# Patient Record
Sex: FEMALE | Race: WHITE | Employment: UNEMPLOYED | URBAN - METROPOLITAN AREA
[De-identification: names, ages, dates, MRNs, and addresses within clinical notes are randomized per-mention and may not be internally consistent; named-entity substitution may affect disease eponyms.]

---

## 2021-02-23 ENCOUNTER — APPOINTMENT (OUTPATIENT)
Dept: GENERAL RADIOLOGY | Age: 31
End: 2021-02-23
Payer: COMMERCIAL

## 2021-02-23 ENCOUNTER — APPOINTMENT (OUTPATIENT)
Dept: CT IMAGING | Age: 31
End: 2021-02-23
Payer: COMMERCIAL

## 2021-02-23 ENCOUNTER — HOSPITAL ENCOUNTER (OUTPATIENT)
Age: 31
Setting detail: OBSERVATION
Discharge: HOME | End: 2021-02-24
Attending: EMERGENCY MEDICINE | Admitting: FAMILY MEDICINE
Payer: COMMERCIAL

## 2021-02-23 DIAGNOSIS — R56.9 SEIZURE (HCC): Primary | ICD-10-CM

## 2021-02-23 DIAGNOSIS — F13.939 BENZODIAZEPINE WITHDRAWAL WITH COMPLICATION (HCC): ICD-10-CM

## 2021-02-23 DIAGNOSIS — F13.939: ICD-10-CM

## 2021-02-23 LAB
ACETAMINOPHEN LEVEL: <5 MCG/ML (ref 10–30)
ALBUMIN SERPL-MCNC: 4.6 G/DL (ref 3.5–5.2)
ALP BLD-CCNC: 63 U/L (ref 35–104)
ALT SERPL-CCNC: 12 U/L (ref 0–32)
AMPHETAMINE SCREEN, URINE: NOT DETECTED
ANION GAP SERPL CALCULATED.3IONS-SCNC: 9 MMOL/L (ref 7–16)
AST SERPL-CCNC: 15 U/L (ref 0–31)
BACTERIA: ABNORMAL /HPF
BARBITURATE SCREEN URINE: POSITIVE
BASOPHILS ABSOLUTE: 0.05 E9/L (ref 0–0.2)
BASOPHILS RELATIVE PERCENT: 0.6 % (ref 0–2)
BENZODIAZEPINE SCREEN, URINE: POSITIVE
BILIRUB SERPL-MCNC: 0.3 MG/DL (ref 0–1.2)
BILIRUBIN URINE: NEGATIVE
BLOOD, URINE: NEGATIVE
BUN BLDV-MCNC: 9 MG/DL (ref 6–20)
CALCIUM SERPL-MCNC: 9.3 MG/DL (ref 8.6–10.2)
CANNABINOID SCREEN URINE: POSITIVE
CHLORIDE BLD-SCNC: 103 MMOL/L (ref 98–107)
CLARITY: CLEAR
CO2: 27 MMOL/L (ref 22–29)
COCAINE METABOLITE SCREEN URINE: POSITIVE
COLOR: YELLOW
CREAT SERPL-MCNC: 0.7 MG/DL (ref 0.5–1)
EOSINOPHILS ABSOLUTE: 0.13 E9/L (ref 0.05–0.5)
EOSINOPHILS RELATIVE PERCENT: 1.6 % (ref 0–6)
EPITHELIAL CELLS, UA: ABNORMAL /HPF
ETHANOL: <10 MG/DL (ref 0–0.08)
FENTANYL SCREEN, URINE: NOT DETECTED
GFR AFRICAN AMERICAN: >60
GFR NON-AFRICAN AMERICAN: >60 ML/MIN/1.73
GLUCOSE BLD-MCNC: 94 MG/DL (ref 74–99)
GLUCOSE URINE: NEGATIVE MG/DL
HCG, URINE, POC: NEGATIVE
HCT VFR BLD CALC: 42.3 % (ref 34–48)
HEMOGLOBIN: 14.2 G/DL (ref 11.5–15.5)
IMMATURE GRANULOCYTES #: 0.02 E9/L
IMMATURE GRANULOCYTES %: 0.3 % (ref 0–5)
KETONES, URINE: NEGATIVE MG/DL
LEUKOCYTE ESTERASE, URINE: ABNORMAL
LYMPHOCYTES ABSOLUTE: 2.27 E9/L (ref 1.5–4)
LYMPHOCYTES RELATIVE PERCENT: 28.8 % (ref 20–42)
Lab: ABNORMAL
Lab: NORMAL
MCH RBC QN AUTO: 30 PG (ref 26–35)
MCHC RBC AUTO-ENTMCNC: 33.6 % (ref 32–34.5)
MCV RBC AUTO: 89.2 FL (ref 80–99.9)
METHADONE SCREEN, URINE: NOT DETECTED
MONOCYTES ABSOLUTE: 0.39 E9/L (ref 0.1–0.95)
MONOCYTES RELATIVE PERCENT: 4.9 % (ref 2–12)
MUCUS: PRESENT /LPF
NEGATIVE QC PASS/FAIL: NORMAL
NEUTROPHILS ABSOLUTE: 5.03 E9/L (ref 1.8–7.3)
NEUTROPHILS RELATIVE PERCENT: 63.8 % (ref 43–80)
NITRITE, URINE: NEGATIVE
OPIATE SCREEN URINE: NOT DETECTED
OXYCODONE URINE: NOT DETECTED
PDW BLD-RTO: 12.8 FL (ref 11.5–15)
PH UA: 7 (ref 5–9)
PHENCYCLIDINE SCREEN URINE: NOT DETECTED
PLATELET # BLD: 260 E9/L (ref 130–450)
PMV BLD AUTO: 9.7 FL (ref 7–12)
POSITIVE QC PASS/FAIL: NORMAL
POTASSIUM SERPL-SCNC: 4 MMOL/L (ref 3.5–5)
PROTEIN UA: NEGATIVE MG/DL
RBC # BLD: 4.74 E12/L (ref 3.5–5.5)
RBC UA: ABNORMAL /HPF (ref 0–2)
SALICYLATE, SERUM: <0.3 MG/DL (ref 0–30)
SODIUM BLD-SCNC: 139 MMOL/L (ref 132–146)
SPECIFIC GRAVITY UA: 1.01 (ref 1–1.03)
TOTAL PROTEIN: 7.7 G/DL (ref 6.4–8.3)
TRICYCLIC ANTIDEPRESSANTS SCREEN SERUM: NEGATIVE NG/ML
TSH SERPL DL<=0.05 MIU/L-ACNC: 0.99 UIU/ML (ref 0.27–4.2)
UROBILINOGEN, URINE: 0.2 E.U./DL
WBC # BLD: 7.9 E9/L (ref 4.5–11.5)
WBC UA: ABNORMAL /HPF (ref 0–5)

## 2021-02-23 PROCEDURE — 80143 DRUG ASSAY ACETAMINOPHEN: CPT

## 2021-02-23 PROCEDURE — G0378 HOSPITAL OBSERVATION PER HR: HCPCS

## 2021-02-23 PROCEDURE — 96376 TX/PRO/DX INJ SAME DRUG ADON: CPT

## 2021-02-23 PROCEDURE — 71045 X-RAY EXAM CHEST 1 VIEW: CPT

## 2021-02-23 PROCEDURE — 96374 THER/PROPH/DIAG INJ IV PUSH: CPT

## 2021-02-23 PROCEDURE — 99220 PR INITIAL OBSERVATION CARE/DAY 70 MINUTES: CPT | Performed by: FAMILY MEDICINE

## 2021-02-23 PROCEDURE — APPSS45 APP SPLIT SHARED TIME 31-45 MINUTES: Performed by: NURSE PRACTITIONER

## 2021-02-23 PROCEDURE — 87088 URINE BACTERIA CULTURE: CPT

## 2021-02-23 PROCEDURE — 84443 ASSAY THYROID STIM HORMONE: CPT

## 2021-02-23 PROCEDURE — 6370000000 HC RX 637 (ALT 250 FOR IP): Performed by: NURSE PRACTITIONER

## 2021-02-23 PROCEDURE — 80179 DRUG ASSAY SALICYLATE: CPT

## 2021-02-23 PROCEDURE — 6360000002 HC RX W HCPCS: Performed by: FAMILY MEDICINE

## 2021-02-23 PROCEDURE — 2580000003 HC RX 258: Performed by: NURSE PRACTITIONER

## 2021-02-23 PROCEDURE — 70450 CT HEAD/BRAIN W/O DYE: CPT

## 2021-02-23 PROCEDURE — 93005 ELECTROCARDIOGRAM TRACING: CPT | Performed by: EMERGENCY MEDICINE

## 2021-02-23 PROCEDURE — 81001 URINALYSIS AUTO W/SCOPE: CPT

## 2021-02-23 PROCEDURE — 85025 COMPLETE CBC W/AUTO DIFF WBC: CPT

## 2021-02-23 PROCEDURE — 2580000003 HC RX 258: Performed by: EMERGENCY MEDICINE

## 2021-02-23 PROCEDURE — 80053 COMPREHEN METABOLIC PANEL: CPT

## 2021-02-23 PROCEDURE — 82077 ASSAY SPEC XCP UR&BREATH IA: CPT

## 2021-02-23 PROCEDURE — 80307 DRUG TEST PRSMV CHEM ANLYZR: CPT

## 2021-02-23 PROCEDURE — 99284 EMERGENCY DEPT VISIT MOD MDM: CPT

## 2021-02-23 RX ORDER — MIRTAZAPINE 7.5 MG/1
7.5 TABLET, FILM COATED ORAL NIGHTLY PRN
Status: ON HOLD | COMMUNITY
End: 2021-02-23

## 2021-02-23 RX ORDER — PHENOBARBITAL 100 MG/1
100 TABLET ORAL 3 TIMES DAILY
Status: ON HOLD | COMMUNITY
Start: 2021-02-22 | End: 2021-02-24 | Stop reason: HOSPADM

## 2021-02-23 RX ORDER — HYDROXYZINE PAMOATE 50 MG/1
100 CAPSULE ORAL EVERY 6 HOURS PRN
Status: ON HOLD | COMMUNITY
End: 2021-02-23

## 2021-02-23 RX ORDER — DOCUSATE SODIUM 100 MG/1
100 CAPSULE, LIQUID FILLED ORAL DAILY PRN
Status: ON HOLD | COMMUNITY
End: 2021-02-23

## 2021-02-23 RX ORDER — SODIUM CHLORIDE 0.9 % (FLUSH) 0.9 %
10 SYRINGE (ML) INJECTION PRN
Status: DISCONTINUED | OUTPATIENT
Start: 2021-02-23 | End: 2021-02-24 | Stop reason: HOSPADM

## 2021-02-23 RX ORDER — 0.9 % SODIUM CHLORIDE 0.9 %
1000 INTRAVENOUS SOLUTION INTRAVENOUS ONCE
Status: COMPLETED | OUTPATIENT
Start: 2021-02-23 | End: 2021-02-23

## 2021-02-23 RX ORDER — SODIUM CHLORIDE 0.9 % (FLUSH) 0.9 %
10 SYRINGE (ML) INJECTION EVERY 12 HOURS SCHEDULED
Status: DISCONTINUED | OUTPATIENT
Start: 2021-02-23 | End: 2021-02-24 | Stop reason: HOSPADM

## 2021-02-23 RX ORDER — ONDANSETRON 2 MG/ML
4 INJECTION INTRAMUSCULAR; INTRAVENOUS EVERY 6 HOURS PRN
Status: DISCONTINUED | OUTPATIENT
Start: 2021-02-23 | End: 2021-02-23

## 2021-02-23 RX ORDER — CEPHALEXIN 500 MG/1
500 CAPSULE ORAL
Status: ON HOLD | COMMUNITY
End: 2021-02-23

## 2021-02-23 RX ORDER — ACETAMINOPHEN 650 MG/1
650 SUPPOSITORY RECTAL EVERY 6 HOURS PRN
Status: DISCONTINUED | OUTPATIENT
Start: 2021-02-23 | End: 2021-02-24 | Stop reason: HOSPADM

## 2021-02-23 RX ORDER — FLUOXETINE HYDROCHLORIDE 20 MG/1
20 CAPSULE ORAL DAILY
COMMUNITY

## 2021-02-23 RX ORDER — NALTREXONE 380 MG
380 KIT INTRAMUSCULAR ONCE
Status: ON HOLD | COMMUNITY
End: 2021-02-23 | Stop reason: ALTCHOICE

## 2021-02-23 RX ORDER — MAGNESIUM HYDROXIDE/ALUMINUM HYDROXICE/SIMETHICONE 120; 1200; 1200 MG/30ML; MG/30ML; MG/30ML
5 SUSPENSION ORAL EVERY 4 HOURS PRN
Status: ON HOLD | COMMUNITY
End: 2021-02-23 | Stop reason: ALTCHOICE

## 2021-02-23 RX ORDER — NICOTINE 21 MG/24HR
1 PATCH, TRANSDERMAL 24 HOURS TRANSDERMAL DAILY
Status: DISCONTINUED | OUTPATIENT
Start: 2021-02-23 | End: 2021-02-24 | Stop reason: HOSPADM

## 2021-02-23 RX ORDER — MIRTAZAPINE 30 MG/1
30 TABLET, FILM COATED ORAL NIGHTLY
COMMUNITY

## 2021-02-23 RX ORDER — LOPERAMIDE HYDROCHLORIDE 2 MG/1
2 CAPSULE ORAL PRN
Status: ON HOLD | COMMUNITY
End: 2021-02-23 | Stop reason: ALTCHOICE

## 2021-02-23 RX ORDER — DICYCLOMINE HCL 20 MG
20 TABLET ORAL EVERY 6 HOURS PRN
Status: ON HOLD | COMMUNITY
End: 2021-02-23

## 2021-02-23 RX ORDER — FLUOXETINE HYDROCHLORIDE 20 MG/1
20 CAPSULE ORAL DAILY
Status: ON HOLD | COMMUNITY
End: 2021-02-23

## 2021-02-23 RX ORDER — LORATADINE 10 MG/1
10 TABLET ORAL DAILY PRN
Status: ON HOLD | COMMUNITY
End: 2021-02-23

## 2021-02-23 RX ORDER — PROMETHAZINE HYDROCHLORIDE 25 MG/1
12.5 TABLET ORAL EVERY 6 HOURS PRN
Status: DISCONTINUED | OUTPATIENT
Start: 2021-02-23 | End: 2021-02-23

## 2021-02-23 RX ORDER — ONDANSETRON 4 MG/1
4 TABLET, FILM COATED ORAL EVERY 6 HOURS PRN
Status: ON HOLD | COMMUNITY
End: 2021-02-24 | Stop reason: HOSPADM

## 2021-02-23 RX ORDER — M-VIT,TX,IRON,MINS/CALC/FOLIC 27MG-0.4MG
1 TABLET ORAL DAILY
Status: DISCONTINUED | OUTPATIENT
Start: 2021-02-23 | End: 2021-02-24 | Stop reason: HOSPADM

## 2021-02-23 RX ORDER — POLYETHYLENE GLYCOL 3350 17 G/17G
17 POWDER, FOR SOLUTION ORAL DAILY PRN
Status: DISCONTINUED | OUTPATIENT
Start: 2021-02-23 | End: 2021-02-24 | Stop reason: HOSPADM

## 2021-02-23 RX ORDER — PRAZOSIN HYDROCHLORIDE 5 MG/1
5 CAPSULE ORAL NIGHTLY
Status: ON HOLD | COMMUNITY
End: 2021-02-24 | Stop reason: HOSPADM

## 2021-02-23 RX ORDER — LORAZEPAM 2 MG/ML
2 INJECTION INTRAMUSCULAR EVERY 6 HOURS PRN
Status: DISCONTINUED | OUTPATIENT
Start: 2021-02-23 | End: 2021-02-24

## 2021-02-23 RX ORDER — CLONIDINE HYDROCHLORIDE 0.1 MG/1
0.1 TABLET ORAL
Status: ON HOLD | COMMUNITY
End: 2021-02-23

## 2021-02-23 RX ORDER — IBUPROFEN 200 MG
400 TABLET ORAL EVERY 6 HOURS PRN
Status: ON HOLD | COMMUNITY
End: 2021-02-24 | Stop reason: HOSPADM

## 2021-02-23 RX ORDER — PHENOBARBITAL 60 MG/1
60 TABLET ORAL 2 TIMES DAILY
Status: ON HOLD | COMMUNITY
Start: 2021-02-24 | End: 2021-02-24 | Stop reason: HOSPADM

## 2021-02-23 RX ORDER — PHENOBARBITAL 60 MG/1
60 TABLET ORAL DAILY
Status: ON HOLD | COMMUNITY
Start: 2021-02-25 | End: 2021-02-24 | Stop reason: HOSPADM

## 2021-02-23 RX ORDER — PHENOBARBITAL 60 MG/1
60 TABLET ORAL 3 TIMES DAILY
Status: ON HOLD | COMMUNITY
Start: 2021-02-23 | End: 2021-02-24 | Stop reason: HOSPADM

## 2021-02-23 RX ORDER — CALCIUM CARBONATE 200(500)MG
2 TABLET,CHEWABLE ORAL PRN
Status: ON HOLD | COMMUNITY
End: 2021-02-23

## 2021-02-23 RX ORDER — ONDANSETRON 2 MG/ML
8 INJECTION INTRAMUSCULAR; INTRAVENOUS EVERY 6 HOURS PRN
Status: ON HOLD | COMMUNITY
End: 2021-02-23

## 2021-02-23 RX ORDER — ACETAMINOPHEN 325 MG/1
650 TABLET ORAL EVERY 6 HOURS PRN
Status: DISCONTINUED | OUTPATIENT
Start: 2021-02-23 | End: 2021-02-24 | Stop reason: HOSPADM

## 2021-02-23 RX ORDER — CHOLECALCIFEROL (VITAMIN D3) 125 MCG
10 CAPSULE ORAL NIGHTLY PRN
Status: ON HOLD | COMMUNITY
End: 2021-02-23

## 2021-02-23 RX ORDER — PHENOBARBITAL 100 MG/1
100 TABLET ORAL 4 TIMES DAILY
Status: ON HOLD | COMMUNITY
Start: 2021-02-22 | End: 2021-02-24 | Stop reason: HOSPADM

## 2021-02-23 RX ORDER — M-VIT,TX,IRON,MINS/CALC/FOLIC 27MG-0.4MG
1 TABLET ORAL DAILY
COMMUNITY

## 2021-02-23 RX ORDER — BACLOFEN 10 MG/1
10 TABLET ORAL EVERY 8 HOURS PRN
Status: ON HOLD | COMMUNITY
End: 2021-02-23

## 2021-02-23 RX ADMIN — SODIUM CHLORIDE, PRESERVATIVE FREE 10 ML: 5 INJECTION INTRAVENOUS at 15:30

## 2021-02-23 RX ADMIN — Medication 1 TABLET: at 15:30

## 2021-02-23 RX ADMIN — LORAZEPAM 2 MG: 2 INJECTION INTRAMUSCULAR at 21:29

## 2021-02-23 RX ADMIN — SODIUM CHLORIDE, PRESERVATIVE FREE 10 ML: 5 INJECTION INTRAVENOUS at 21:29

## 2021-02-23 RX ADMIN — SODIUM CHLORIDE 1000 ML: 9 INJECTION, SOLUTION INTRAVENOUS at 11:26

## 2021-02-23 RX ADMIN — LORAZEPAM 2 MG: 2 INJECTION INTRAMUSCULAR at 15:30

## 2021-02-23 ASSESSMENT — PAIN SCALES - GENERAL: PAINLEVEL_OUTOF10: 0
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
## 2021-02-23 NOTE — ED NOTES
Receipt of SBAR confirmed by Rosa Degroot on [place] [id number], [address] [facility]  02/23/21 9837
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
## 2021-02-23 NOTE — PROGRESS NOTES
Pt declines database at this time. \"This is the worst hospital I ever been to, I give you Zero stars. \"

## 2021-02-24 VITALS
SYSTOLIC BLOOD PRESSURE: 115 MMHG | OXYGEN SATURATION: 95 % | DIASTOLIC BLOOD PRESSURE: 69 MMHG | WEIGHT: 175.3 LBS | HEART RATE: 89 BPM | RESPIRATION RATE: 16 BRPM | BODY MASS INDEX: 26.57 KG/M2 | TEMPERATURE: 98.3 F | HEIGHT: 68 IN

## 2021-02-24 LAB
ANION GAP SERPL CALCULATED.3IONS-SCNC: 9 MMOL/L (ref 7–16)
BUN BLDV-MCNC: 10 MG/DL (ref 6–20)
CALCIUM SERPL-MCNC: 8.8 MG/DL (ref 8.6–10.2)
CHLORIDE BLD-SCNC: 105 MMOL/L (ref 98–107)
CO2: 23 MMOL/L (ref 22–29)
CREAT SERPL-MCNC: 0.6 MG/DL (ref 0.5–1)
GFR AFRICAN AMERICAN: >60
GFR NON-AFRICAN AMERICAN: >60 ML/MIN/1.73
GLUCOSE BLD-MCNC: 83 MG/DL (ref 74–99)
HCT VFR BLD CALC: 40.1 % (ref 34–48)
HEMOGLOBIN: 13.8 G/DL (ref 11.5–15.5)
MCH RBC QN AUTO: 30.5 PG (ref 26–35)
MCHC RBC AUTO-ENTMCNC: 34.4 % (ref 32–34.5)
MCV RBC AUTO: 88.5 FL (ref 80–99.9)
PDW BLD-RTO: 12.7 FL (ref 11.5–15)
PLATELET # BLD: 263 E9/L (ref 130–450)
PMV BLD AUTO: 10.3 FL (ref 7–12)
POTASSIUM REFLEX MAGNESIUM: 3.9 MMOL/L (ref 3.5–5)
RBC # BLD: 4.53 E12/L (ref 3.5–5.5)
SODIUM BLD-SCNC: 137 MMOL/L (ref 132–146)
WBC # BLD: 6.9 E9/L (ref 4.5–11.5)

## 2021-02-24 PROCEDURE — 99225 PR SBSQ OBSERVATION CARE/DAY 25 MINUTES: CPT | Performed by: PSYCHIATRY & NEUROLOGY

## 2021-02-24 PROCEDURE — 6360000002 HC RX W HCPCS: Performed by: FAMILY MEDICINE

## 2021-02-24 PROCEDURE — 80048 BASIC METABOLIC PNL TOTAL CA: CPT

## 2021-02-24 PROCEDURE — 36415 COLL VENOUS BLD VENIPUNCTURE: CPT

## 2021-02-24 PROCEDURE — 6370000000 HC RX 637 (ALT 250 FOR IP): Performed by: PSYCHIATRY & NEUROLOGY

## 2021-02-24 PROCEDURE — G0378 HOSPITAL OBSERVATION PER HR: HCPCS

## 2021-02-24 PROCEDURE — APPSS45 APP SPLIT SHARED TIME 31-45 MINUTES: Performed by: NURSE PRACTITIONER

## 2021-02-24 PROCEDURE — 6370000000 HC RX 637 (ALT 250 FOR IP): Performed by: NURSE PRACTITIONER

## 2021-02-24 PROCEDURE — 96376 TX/PRO/DX INJ SAME DRUG ADON: CPT

## 2021-02-24 PROCEDURE — 85027 COMPLETE CBC AUTOMATED: CPT

## 2021-02-24 PROCEDURE — 2580000003 HC RX 258: Performed by: NURSE PRACTITIONER

## 2021-02-24 RX ORDER — HYDROXYZINE HYDROCHLORIDE 50 MG/ML
25 INJECTION, SOLUTION INTRAMUSCULAR EVERY 6 HOURS PRN
Status: DISCONTINUED | OUTPATIENT
Start: 2021-02-24 | End: 2021-02-24 | Stop reason: HOSPADM

## 2021-02-24 RX ORDER — PHENOBARBITAL 64.8 MG/1
64.8 TABLET ORAL 2 TIMES DAILY
Qty: 6 TABLET | Refills: 0 | Status: SHIPPED | OUTPATIENT
Start: 2021-02-24 | End: 2021-02-27

## 2021-02-24 RX ORDER — LORAZEPAM 0.5 MG/1
TABLET ORAL
Qty: 7 TABLET | Refills: 0 | Status: SHIPPED | OUTPATIENT
Start: 2021-02-24 | End: 2021-02-28

## 2021-02-24 RX ORDER — LORAZEPAM 0.5 MG/1
0.5 TABLET ORAL ONCE
Status: DISCONTINUED | OUTPATIENT
Start: 2021-02-25 | End: 2021-02-24 | Stop reason: HOSPADM

## 2021-02-24 RX ORDER — HYDROXYZINE PAMOATE 25 MG/1
25 CAPSULE ORAL EVERY 6 HOURS PRN
Status: DISCONTINUED | OUTPATIENT
Start: 2021-02-24 | End: 2021-02-24 | Stop reason: HOSPADM

## 2021-02-24 RX ORDER — MIRTAZAPINE 15 MG/1
30 TABLET, FILM COATED ORAL NIGHTLY
Status: DISCONTINUED | OUTPATIENT
Start: 2021-02-24 | End: 2021-02-24 | Stop reason: HOSPADM

## 2021-02-24 RX ORDER — LORAZEPAM 0.5 MG/1
0.5 TABLET ORAL ONCE
Status: DISCONTINUED | OUTPATIENT
Start: 2021-02-24 | End: 2021-02-24 | Stop reason: HOSPADM

## 2021-02-24 RX ORDER — HYDROXYZINE PAMOATE 25 MG/1
25 CAPSULE ORAL EVERY 6 HOURS PRN
Qty: 12 CAPSULE | Refills: 0 | Status: SHIPPED | OUTPATIENT
Start: 2021-02-24 | End: 2021-02-27

## 2021-02-24 RX ORDER — PHENOBARBITAL 32.4 MG/1
64.8 TABLET ORAL 2 TIMES DAILY
Status: DISCONTINUED | OUTPATIENT
Start: 2021-02-24 | End: 2021-02-24 | Stop reason: HOSPADM

## 2021-02-24 RX ADMIN — PHENOBARBITAL 64.8 MG: 32.4 TABLET ORAL at 12:53

## 2021-02-24 RX ADMIN — SODIUM CHLORIDE, PRESERVATIVE FREE 10 ML: 5 INJECTION INTRAVENOUS at 08:32

## 2021-02-24 RX ADMIN — LORAZEPAM 2 MG: 2 INJECTION INTRAMUSCULAR at 05:55

## 2021-02-24 ASSESSMENT — PAIN SCALES - GENERAL: PAINLEVEL_OUTOF10: 0

## 2021-02-24 NOTE — PROGRESS NOTES
Spoke with [facility]- They state that if the patient is released this afternoon, she may be able to hold her bed at their facility but they will only hold the bed for 24 hours. Will need to call [facility] center upon patients discharge to confirm bed placement.

## 2021-02-24 NOTE — CARE COORDINATION
CASE MANAGEMENT. ..... Patient from [facility] with Benzodiazepine-withdrawal seizure. Neuro saw patient this am and med changes were made. Per edith, patient is stable to return to [facility]. Spoke with [facility], and [facility] [phone]. Patient update given and requested information faxed to them at [phone] for medical director to review. Met with patient at the bedside and informed her of plans. Will cont to follow.

## 2021-02-24 NOTE — CONSULTS
NEUROLOGY CONSULT NOTE       Requesting Physician: James Santos MD     Reason for Consult:  Evaluate for \"seizure-likely withdrawal\"    History of Present Illness:  Celestino Lagos is a 35 y.o. female admitted to [facility] on 2021. She was at [facility] for substance abuse rehab and had not had benzodiazepines for 36 hours when she had a seizure. Not significantly postictal in the emergency department. She has had this problem before when in rehab. Her urine drug screen was positive for cocaine as well as benzodiazepines and cannabinoids. She had received some phenobarbital at [facility]. She had, it looks like, 6 emergency department visits in the [facility] of this month. Intoxicated by alcohol level at 1 of these. Here she has received Ativan which is ordered \"as needed agitation\". She got 2 mg twice yesterday and 2 mg at 6 AM today. Feels anxious and was expecting to get this again at noon though she is not requesting benzodiazepines in particular. At home she takes multiple Xanax a day. Past Medical History:        Diagnosis Date    Anxiety     Bipolar 1 disorder ([facility] [address] [facility]     Depression     Seizures ([facility] [address]            Procedure Laterality Date     SECTION         Social History:  Social History     Tobacco Use   Smoking Status Current Every Day Smoker    Packs/day: 1.00    Types: Cigars   Smokeless Tobacco Never Used     Social History     Substance and Sexual Activity   Alcohol Use Not Currently   Occasional alcohol  Social History     Substance and Sexual Activity   Drug Use Yes    Types:  Other-see comments, Marijuana, IV, Opiates    See HPI  Single    Family History:   No family history of seizures    Review of Systems:  CONSTITUTIONAL:  negative for fever or recent illness  EYE:  No recent visual changes  ENT:  negative for dysphagia  RESPIRATORY:  negative for dyspnea  CARDIOVASCULAR:  negative for chest pain  GASTROINTESTINAL:  negative for nausea  HEMATOLOGIC/LYMPHATIC:  negative for unusual bleeding  MUSCULOSKELETAL:    BEHAVIOR/PSYCH: Feels very anxious off her usual psychiatric medications; having difficulty off cigarettes  SKIN: negative for rash  GENITOURINARY: negative for dysuria  NEUROLOGIC: No tremor; no focal weakness or numbness    Allergies: Allergies   Allergen Reactions    Sulfa Antibiotics         Current Medications:       LORazepam (ATIVAN) injection 2 mg, Q6H PRN      therapeutic multivitamin-minerals 1 tablet, Daily      sodium chloride flush 0.9 % injection 10 mL, 2 times per day      sodium chloride flush 0.9 % injection 10 mL, PRN      polyethylene glycol (GLYCOLAX) packet 17 g, Daily PRN      acetaminophen (TYLENOL) tablet 650 mg, Q6H PRN    Or      acetaminophen (TYLENOL) suppository 650 mg, Q6H PRN      nicotine (NICODERM CQ) 21 MG/24HR 1 patch, Daily       Medications Prior to Admission: mirtazapine (REMERON) 30 MG tablet, Take 30 mg by mouth nightly  prazosin (MINIPRESS) 5 MG capsule, Take 5 mg by mouth nightly  lurasidone (LATUDA) 80 MG TABS tablet, Take 80 mg by mouth daily   [] PHENobarbital (LUMINAL) 100 MG tablet, 100 mg 4 times daily. [] PHENobarbital (LUMINAL) 100 MG tablet, Take 100 mg by mouth 3 times daily. PHENobarbital (LUMINAL) 60 MG tablet, Take 60 mg by mouth 3 times daily. PHENobarbital (LUMINAL) 60 MG tablet, Take 60 mg by mouth 2 times daily. [START ON 2021] PHENobarbital (LUMINAL) 60 MG tablet, Take 60 mg by mouth daily.   Multiple Vitamins-Minerals (THERAPEUTIC MULTIVITAMIN-MINERALS) tablet, Take 1 tablet by mouth daily  ondansetron (ZOFRAN) 4 MG tablet, Take 4 mg by mouth every 6 hours as needed for Nausea  ibuprofen (ADVIL;MOTRIN) 200 MG tablet, Take 400 mg by mouth every 6 hours as needed for Pain or Fever  FLUoxetine (PROZAC) 20 MG capsule, Take 20 mg by mouth daily  [DISCONTINUED] FLUoxetine (PROZAC) 20 MG capsule, Take 20 mg by mouth daily  [DISCONTINUED] cephALEXin (KEFLEX) 500 MG capsule, Take 500 mg by mouth  [DISCONTINUED] Multiple Vitamins-Minerals (THERA-M PO), Take by mouth  [DISCONTINUED] diphenhydrAMINE (BENADRYL) 50 MG tablet, Take 50 mg by mouth every 6 hours as needed for Allergies or Sleep  [DISCONTINUED] loratadine (CLARITIN) 10 MG tablet, Take 10 mg by mouth daily as needed (Sinus Congestion)  [DISCONTINUED] benzocaine-menthol (CEPACOL SORE THROAT) 15-3.6 MG lozenge, Take 1 lozenge by mouth every 2 hours as needed for Sore Throat  [DISCONTINUED] cloNIDine (CATAPRES) 0.1 MG tablet, Take 0.1 mg by mouth every 2 hours as needed (Sweats/Anxiety)  [DISCONTINUED] hydrOXYzine (VISTARIL) 50 MG capsule, Take 100 mg by mouth every 6 hours as needed for Anxiety  [DISCONTINUED] ondansetron (ZOFRAN) 40 MG/20ML SOLN injection, Infuse 8 mg intravenously every 6 hours as needed for Nausea or Vomiting  [DISCONTINUED] docusate sodium (COLACE) 100 MG capsule, Take 100 mg by mouth daily as needed for Constipation  [DISCONTINUED] dicyclomine (BENTYL) 20 MG tablet, Take 20 mg by mouth every 6 hours as needed (Abdominal Cramps)  [DISCONTINUED] calcium carbonate (TUMS) 500 MG chewable tablet, Take 2 tablets by mouth as needed (Indigestion after Meals)  [DISCONTINUED] magnesium hydroxide (MILK OF MAGNESIA) 400 MG/5ML suspension, Take 30 mLs by mouth daily as needed for Constipation  [DISCONTINUED] baclofen (LIORESAL) 10 MG tablet, Take 10 mg by mouth every 8 hours as needed (Restless Muscles or Body Aches)  [DISCONTINUED] mirtazapine (REMERON) 7.5 MG tablet, Take 7.5 mg by mouth nightly as needed (Insomnia)  [DISCONTINUED] melatonin 5 MG TABS tablet, Take 10 mg by mouth nightly as needed (Insomnia)    Physical Exam:  /69   Pulse 89   Temp 98.3 °F (36.8 °C) (Oral)   Resp 16   Ht 5' 8\" (1.727 m)   Wt 175 lb 4.8 oz (79.5 kg)   LMP 12/23/2020   SpO2 95%   BMI 26.65 kg/m²  I Body mass index is 26.65 kg/m².  I   Wt Readings from Last 1 Encounters:   02/24/21 175 lb 4.8 oz (79.5 kg)        GENERAL: Anxious but otherwise healthy-appearing 35year-old  EYE:  Fundi not examined due to the Covid-19 outbreak  CARDIOVASCULAR:  Heart regular rhythm with borderline tachycardia. No carotid bruits detected. NEUROLOGIC:  Level of Alertness: alert  Orientation: oriented to person, place and time  Memory and Fund of Knowledge:  normal  Attention/Concentration: normal  Language: no aphasia  Cranial Nerves: pupils are equal; extraocular muscles intact; facial strength and sensation are intact; hearing is intact to soft voice; the palate raises midline, and the tongue protrudes midline; shoulder shrug is symmetric  Motor Exam: Normal tone in all extremities. Strength is MRC grade 5 in all extremities bilaterally. Sensory: Sensory symmetric to light touch  Coordination: Cerebellar function is intact for the nose-finger-nose maneuver, and for rapid alternating movements  Deep Tendon Reflexes: Reflexes are intact and bilaterally symmetric  Plantar Responses:  Downgoing  Abnormal movements: no tremor  Station and gait: Normal    Diagnostics:  CBC:   Lab Results   Component Value Date    WBC 6.9 02/24/2021    RBC 4.53 02/24/2021    HGB 13.8 02/24/2021    HCT 40.1 02/24/2021    MCV 88.5 02/24/2021    MCH 30.5 02/24/2021    MCHC 34.4 02/24/2021    RDW 12.7 02/24/2021     02/24/2021    MPV 10.3 02/24/2021     CMP:    Lab Results   Component Value Date     02/24/2021    K 3.9 02/24/2021     02/24/2021    CO2 23 02/24/2021    BUN 10 02/24/2021    CREATININE 0.6 02/24/2021    GFRAA >60 02/24/2021    LABGLOM >60 02/24/2021    GLUCOSE 83 02/24/2021    PROT 7.7 02/23/2021    LABALBU 4.6 02/23/2021    CALCIUM 8.8 02/24/2021    BILITOT 0.3 02/23/2021    ALKPHOS 63 02/23/2021    AST 15 02/23/2021    ALT 12 02/23/2021       CT brain images reviewed: Brain normal; left frontal soft tissue swelling      Impression:  Benzodiazepine-withdrawal seizure. Doubt underlying epilepsy.       Recommendations :  Unfortunately we have now given her 6 mg of Ativan in the first 24 hours of her admission here. I am afraid that she will have another withdrawal seizure. I have stopped the as needed Ativan but have written her 2 very small doses of Ativan to be given tonight and, if she still here, tomorrow morning. I have put her on phenobarbital which will be tapered at [facility] and given her back some of her usual psychiatric medications. I written for as needed Vistaril as needed for anxiety or agitation. An EEG is ordered. The patient is very anxious to get back to her rehab program and I do not think it is absolutely essential she get that EEG, if they do not come to perform the test today. Please call if you'd like to further discuss this patient, and I appreciate the opportunity to participate in her care.       Electronically signed by Ronni Carrasquillo DO on 2/24/2021 at 11:12 AM       Addendum 2/24/21 1150:  Sarai Alken contraindicated with phenobarbital, so I have cancelled this

## 2021-02-24 NOTE — CARE COORDINATION
CASE MANAGEMENT. .. Walked patient down to her ride provided by [facility]. She has discharge instructions in hand. Scripts in sealed envelope given to .

## 2021-02-24 NOTE — PROGRESS NOTES
Upon rounding on the patient, she expressed that she wanted to leave and sign out AMA due to frustration of waiting on physicians to round for discharge. Nurse manager, , Dr Alia Urrutia and Dr Sim Mata spoke with patient regarding need for clearance from [facility] to accept patient back to facility before we can discharge her from the hospital. Patient is agreeable for now to wait to leave. She is refusing telemetry monitor at this time.

## 2021-02-24 NOTE — DISCHARGE SUMMARY
[facility] Physician Discharge Summary       Discharge to [facility]       Stable condition  Activity: as tolerated     Patient ID:  Zeinab Ortega  [id number]  21 y.o.  1990    Admit date: 2/23/2021    Discharge date and time:  2/24/2021  1:45 PM    Admission Diagnoses: Active Problems:    Withdrawal from benzodiazepine, with unspecified complication ([address]    Benzodiazepine withdrawal with complication ([address]    Tobacco dependence  Resolved Problems:    * No resolved hospital problems. *      Discharge Diagnoses: Active Problems:    Withdrawal from benzodiazepine, with unspecified complication ([address]    Benzodiazepine withdrawal with complication ([facility] [address]    Tobacco dependence  Resolved Problems:    * No resolved hospital problems. *      Consults:  IP CONSULT TO NEUROLOGY  IP CONSULT TO SOCIAL WORK    Procedures: none    Hospital Course:   Patient Zeinab Ortega is a 35 y.o. presented with Withdrawal from benzodiazepine, with unspecified complication [facility]) [K07.926]   Patient sent to ER from [place] [address] for unwitnessed seizure. She had been there for approximately 36 hours. H/o benzodiazepine abuse and alcohol abuse. H/o withdrawal seizures in the past. Neurology was consulted. Patient was treated for;    1.  Seizure: likely related to benzodiazepine withdrawal: PT sent in From [facility]. She had been there for approximately 36 hours. She reported she woke up at 7 am PTA and felt \" off.\" 9: 30 am she had an unwitnessed seizure and sent to ER. She had been on phenobarbital taper and remeron at [facility]. Reporting she takes 5 xanax per day. She gets \" off the street. \" Not very forthcoming in HPI. UDS + barbituates ( has been on phenobarb) + marijuana, + cocaine, benzos. + tobacco use. Also to note h/o alcohol- not sure how much she is drinking. Neurology consulted- appreciate input. Pt very anxious regarding going back to [facility] and not losing bed today.  She was also shifts: In: 4433.3 [IV Piggyback:4433.3]  Out: -   I/O this shift:  In: 120 [P.O.:120]  Out: 900 [Urine:900]      LABS:  Recent Labs     02/23/21  1108 02/24/21  0410    137   K 4.0 3.9    105   CO2 27 23   BUN 9 10   CREATININE 0.7 0.6   GLUCOSE 94 83   CALCIUM 9.3 8.8       Recent Labs     02/23/21  1108 02/24/21  0410   WBC 7.9 6.9   RBC 4.74 4.53   HGB 14.2 13.8   HCT 42.3 40.1   MCV 89.2 88.5   MCH 30.0 30.5   MCHC 33.6 34.4   RDW 12.8 12.7    263   MPV 9.7 10.3       No results for input(s): POCGLU in the last 72 hours. Imaging:  Ct Head Wo Contrast    Result Date: 2/23/2021  EXAMINATION: CT OF THE HEAD WITHOUT CONTRAST  2/23/2021 11:56 am TECHNIQUE: CT of the head was performed without the administration of intravenous contrast. Dose modulation, iterative reconstruction, and/or weight based adjustment of the mA/kV was utilized to reduce the radiation dose to as low as reasonably achievable. COMPARISON: None. HISTORY: ORDERING SYSTEM PROVIDED HISTORY: mental status change TECHNOLOGIST PROVIDED HISTORY: Has a \"code stroke\" or \"stroke alert\" been called? ->No Reason for exam:->mental status change FINDINGS: BRAIN/VENTRICLES: There is no acute intracranial hemorrhage, mass effect or midline shift. No abnormal extra-axial fluid collection. The gray-white differentiation is maintained without evidence of an acute infarct. There is no evidence of hydrocephalus. ORBITS: The visualized portion of the orbits demonstrate no acute abnormality. SINUSES: The visualized paranasal sinuses and mastoid air cells demonstrate no acute abnormality. SOFT TISSUES/SKULL:  No acute abnormality of the visualized skull or soft tissues. There is a small left supraorbital soft tissue contusion. No acute intracranial abnormality. Specifically, there is no intracranial hemorrhage.  Small left supraorbital soft tissue contusion    Xr Chest Portable    Result Date: 2/23/2021  EXAMINATION: ONE XRAY VIEW OF THE CHEST 2/23/2021 10:55 am COMPARISON: None HISTORY: ORDERING SYSTEM PROVIDED HISTORY: seizure TECHNOLOGIST PROVIDED HISTORY: Reason for exam:->seizure FINDINGS: The lungs are without acute focal process. There is no effusion or pneumothorax. The cardiomediastinal silhouette is without acute process. The osseous structures are without acute process. No acute process. Patient Instructions:      Medication List      START taking these medications    hydrOXYzine 25 MG capsule  Commonly known as: VISTARIL  Take 1 capsule by mouth every 6 hours as needed for Anxiety     LORazepam 0.5 MG tablet  Commonly known as: Ativan  Take 2 tablets by mouth every 8 hours for 1 day, THEN 2 tablets 2 times daily for 1 day, THEN 2 tablets daily for 1 day, THEN 1 tablet daily for 1 day. Start taking on: February 24, 2021        CHANGE how you take these medications    PHENobarbital 64.8 MG tablet  Commonly known as: LUMINAL  Take 1 tablet by mouth 2 times daily for 3 days. What changed:   · medication strength  · how much to take  · how to take this  · when to take this  · Another medication with the same name was removed. Continue taking this medication, and follow the directions you see here.         CONTINUE taking these medications    FLUoxetine 20 MG capsule  Commonly known as: PROZAC     mirtazapine 30 MG tablet  Commonly known as: REMERON     therapeutic multivitamin-minerals tablet        STOP taking these medications    aluminum & magnesium hydroxide-simethicone 200-200-20 MG/5ML Susp suspension  Commonly known as: MAALOX     ibuprofen 200 MG tablet  Commonly known as: ADVIL;MOTRIN     Latuda 80 MG Tabs tablet  Generic drug: lurasidone     loperamide 2 MG capsule  Commonly known as: IMODIUM     ondansetron 4 MG tablet  Commonly known as: ZOFRAN     prazosin 5 MG capsule  Commonly known as: MINIPRESS     Vivitrol 380 MG injection  Generic drug: naltrexone           Where to Get Your Medications      You can get these medications from any pharmacy    Bring a paper prescription for each of these medications  · hydrOXYzine 25 MG capsule  · LORazepam 0.5 MG tablet  · PHENobarbital 64.8 MG tablet           Note that more than 30 minutes was spent in preparing discharge papers, discussing discharge with patient, medication review, etc.    Signed:  Electronically signed by AVIS Payton on 2/24/2021 at 1:45 PM

## 2021-02-25 LAB — URINE CULTURE, ROUTINE: NORMAL

## 2021-02-26 LAB
EKG ATRIAL RATE: 66 BPM
EKG P AXIS: 60 DEGREES
EKG P-R INTERVAL: 136 MS
EKG Q-T INTERVAL: 396 MS
EKG QRS DURATION: 80 MS
EKG QTC CALCULATION (BAZETT): 415 MS
EKG R AXIS: 65 DEGREES
EKG T AXIS: 61 DEGREES
EKG VENTRICULAR RATE: 66 BPM

## 2021-02-26 PROCEDURE — 93010 ELECTROCARDIOGRAM REPORT: CPT | Performed by: INTERNAL MEDICINE
